# Patient Record
Sex: MALE | Race: OTHER | URBAN - METROPOLITAN AREA
[De-identification: names, ages, dates, MRNs, and addresses within clinical notes are randomized per-mention and may not be internally consistent; named-entity substitution may affect disease eponyms.]

---

## 2019-04-05 NOTE — PATIENT DISCUSSION
PRESCRIBE SURGERY DROPS PENDING STUDY APPROVAL. IN THE EVENT THE PT DOES NOT PROCEED WITH THE STUDY. PATIENT PREFERS [facility].

## 2019-04-05 NOTE — PATIENT DISCUSSION
PATIENT MAY QUALIFY FOR LESS DROPS RESEARCH STUDY AND AFTER DISCUSSION WOULD LIKE TO RECEIVE MORE INFORMATION BEFORE DECIDING.

## 2019-04-05 NOTE — PATIENT DISCUSSION
REFRACTIVE ERROR, OU - DISCUSSED OPTION OF CORRECTING AT THE TIME OF CATARACT SURGERY. PT UNDERSTANDS AND ELECTS TO CONSIDER THEIR OPTIONS.  PT NOT AN EDOF OR MF CANDIDATE DUE TO ARMD.

## 2019-04-18 NOTE — PATIENT DISCUSSION
Surgery  Counseling: I have discussed the option of glasses versus cataract surgery versus following . It was explained that when vision no longer meets the patient's visual needs and a new prescription for glasses is not likely to improve all of the patient's visual symptoms, the option of cataract surgery is a reasonable next step. It was explained that there is no guarantee that removing the cataract will improve their visual symptoms, however; it is believed that the cataract is contributing to the patient's visual impairment and surgery may significantly improve both the visual and functional status of the patient. The risks, benefits and alternatives of surgery were discussed with the patient. After this discussion, the patient desires to proceed with cataract surgery with implantation of an intraocular lens to improve vision for reading small print, reducing glare.

## 2019-04-25 NOTE — PATIENT DISCUSSION
***This patient had traditional  cataract surgery performed. A monofocal IOL was placed to achieve a target refraction of plano (which should provide them with satisfactory distance vision with aid of glasses of contacts). ***

## 2021-03-04 ENCOUNTER — IMPORTED ENCOUNTER (OUTPATIENT)
Dept: URBAN - METROPOLITAN AREA CLINIC 9 | Facility: CLINIC | Age: 66
End: 2021-03-04

## 2021-03-04 PROBLEM — H31.011: Noted: 2021-03-04

## 2021-03-04 PROBLEM — H31.013: Noted: 2021-03-04

## 2021-03-04 PROBLEM — H40.1122: Status: STABILIZING | Noted: 2021-03-04

## 2021-03-04 PROBLEM — H40.1123: Status: STABILIZING | Noted: 2021-03-04

## 2021-10-16 ASSESSMENT — TONOMETRY
OS_IOP_MMHG: 19
OD_IOP_MMHG: 19

## 2021-10-16 ASSESSMENT — VISUAL ACUITY: OS_CC: 20/100 - SN

## 2021-11-02 ENCOUNTER — ESTABLISHED PATIENT (OUTPATIENT)
Dept: URBAN - METROPOLITAN AREA CLINIC 15 | Facility: CLINIC | Age: 66
End: 2021-11-02

## 2021-11-02 DIAGNOSIS — H40.1113: ICD-10-CM

## 2021-11-02 DIAGNOSIS — H40.1123: ICD-10-CM

## 2021-11-02 PROCEDURE — 92014 COMPRE OPH EXAM EST PT 1/>: CPT

## 2021-11-02 PROCEDURE — 92133 CPTRZD OPH DX IMG PST SGM ON: CPT

## 2021-11-02 ASSESSMENT — TONOMETRY
OD_IOP_MMHG: 22
OS_IOP_MMHG: 17

## 2021-11-02 ASSESSMENT — VISUAL ACUITY: OS_CC: 20/200

## 2022-01-18 NOTE — PATIENT DISCUSSION
Begin Tobramycin/Dexamethasone drops BID x 2 weeks and Maxitrol ointment at bedtime x 2 weeks, sent in opia.

## 2022-06-24 RX ORDER — AMLODIPINE BESYLATE 10 MG/1
TABLET ORAL
COMMUNITY

## 2022-06-24 RX ORDER — LATANOPROST 50 UG/ML
SOLUTION/ DROPS OPHTHALMIC
COMMUNITY

## 2022-06-24 RX ORDER — BRINZOLAMIDE 10 MG/ML
SUSPENSION/ DROPS OPHTHALMIC
COMMUNITY

## 2022-06-24 RX ORDER — SIMVASTATIN 20 MG
TABLET ORAL
COMMUNITY

## 2022-06-24 RX ORDER — ESOMEPRAZOLE MAGNESIUM 40 MG/1
1 CAPSULE, DELAYED RELEASE ORAL
COMMUNITY

## 2022-06-24 RX ORDER — HYDROCHLOROTHIAZIDE 12.5 MG/1
TABLET ORAL
COMMUNITY

## 2022-06-24 RX ORDER — SILDENAFIL 100 MG/1
TABLET, FILM COATED ORAL
COMMUNITY

## 2022-06-24 RX ORDER — GLIMEPIRIDE 2 MG/1
TABLET ORAL
COMMUNITY

## 2022-06-30 ENCOUNTER — ESTABLISHED PATIENT (OUTPATIENT)
Dept: URBAN - METROPOLITAN AREA CLINIC 5 | Facility: CLINIC | Age: 67
End: 2022-06-30

## 2022-06-30 DIAGNOSIS — Z96.1: ICD-10-CM

## 2022-06-30 DIAGNOSIS — H40.1122: ICD-10-CM

## 2022-06-30 DIAGNOSIS — H40.1113: ICD-10-CM

## 2022-06-30 PROCEDURE — 99214 OFFICE O/P EST MOD 30 MIN: CPT

## 2022-06-30 ASSESSMENT — VISUAL ACUITY
OS_PH: 20/100-2
OS_CC: 20/200

## 2022-06-30 ASSESSMENT — TONOMETRY
OS_IOP_MMHG: 17
OD_IOP_MMHG: 22
OS_IOP_MMHG: 15
OD_IOP_MMHG: 20

## 2022-07-20 LAB
ANION GAP SERPL CALC-SCNC: 9 MMOL/L (ref 2–17)
BUN SERPL-MCNC: 26 MG/DL (ref 8–23)
CALCIUM SERPL-MCNC: 9 MG/DL (ref 8.8–10.2)
CHLORIDE SERPL-SCNC: 105 MMOL/L (ref 98–107)
CREAT SERPL-MCNC: 1.7 MG/DL (ref 0.7–1.3)
DEPRECATED HCO3 PLAS-SCNC: 26 MMOL/L (ref 22–29)
GFR SERPL CREATININE-BSD FRML MDRD: 44 ML/MIN/1.73M²
GLUCOSE SERPL-MCNC: 124 MG/DL (ref 70–99)
NT-PROBNP SERPL-MCNC: 524 PG/ML (ref 0–125)
OSMOLALITY SERPL CALC.SUM OF ELEC: 285 MOSM/KG (ref 270–287)
POTASSIUM SERPL-SCNC: 4 MMOL/L (ref 3.5–5.3)
SODIUM SERPL-SCNC: 140 MMOL/L (ref 135–145)

## 2022-07-21 LAB
A/G RATIO: 1.3 (ref 0.7–1.7)
ALBUMIN ELP: 3.3 G/DL (ref 2.9–4.4)
ALPHA 1: 0.1 G/DL (ref 0–0.4)
ALPHA 2: 0.5 G/DL (ref 0.4–1)
BETA GLOBULIN: 0.9 G/DL (ref 0.7–1.3)
COMMENT: ABNORMAL
GAMMA GLOBULIN: 1.2 G/DL (ref 0.4–1.8)
GLOBULIN: 2.6 G/DL (ref 2.2–3.9)
KAPPA FREE LIGHT CHAINS QNT: 54.9 MG/L (ref 3.3–19.4)
KAPPA/LAMBDA FREE LIGHT CHAIN RATIO: 2.16 (ref 0.26–1.65)
LAMBDA FREE LIGHT CHAINS QNT: 25.4 MG/L (ref 5.7–26.3)
M SPIKE: ABNORMAL G/DL
PROTEIN TOTAL: 5.9 G/DL (ref 6–8.5)

## 2022-07-26 LAB
Lab: 10.9 %
Lab: 11 %
Lab: 2.3 %
Lab: 4 %
Lab: 71.8 %
Lab: ABNORMAL
Lab: ABNORMAL
M SPIKE % 24HR URINE PROTEIN ELEC: ABNORMAL %
PROTEIN 24 HOUR URINE: 982 MG/24HR (ref 30–150)
URINE TOTAL PROTEIN: 51.7 MG/DL

## 2023-01-19 ENCOUNTER — ESTABLISHED PATIENT (OUTPATIENT)
Dept: URBAN - METROPOLITAN AREA CLINIC 12 | Facility: CLINIC | Age: 68
End: 2023-01-19

## 2023-01-19 DIAGNOSIS — Z96.1: ICD-10-CM

## 2023-01-19 DIAGNOSIS — H40.1113: ICD-10-CM

## 2023-01-19 DIAGNOSIS — H31.011: ICD-10-CM

## 2023-01-19 DIAGNOSIS — H35.342: ICD-10-CM

## 2023-01-19 DIAGNOSIS — H40.1122: ICD-10-CM

## 2023-01-19 PROCEDURE — 92014 COMPRE OPH EXAM EST PT 1/>: CPT

## 2023-01-19 PROCEDURE — 92134 CPTRZ OPH DX IMG PST SGM RTA: CPT

## 2023-01-19 ASSESSMENT — VISUAL ACUITY
OS_CC: 20/400
OU_CC: 20/400

## 2023-01-19 ASSESSMENT — TONOMETRY
OS_IOP_MMHG: 10
OD_IOP_MMHG: 14

## 2023-07-06 ENCOUNTER — ESTABLISHED PATIENT (OUTPATIENT)
Dept: URBAN - METROPOLITAN AREA CLINIC 12 | Facility: CLINIC | Age: 68
End: 2023-07-06

## 2023-07-06 DIAGNOSIS — H40.1113: ICD-10-CM

## 2023-07-06 DIAGNOSIS — H40.1122: ICD-10-CM

## 2023-07-06 DIAGNOSIS — Z96.1: ICD-10-CM

## 2023-07-06 PROCEDURE — 92012 INTRM OPH EXAM EST PATIENT: CPT

## 2023-07-06 ASSESSMENT — TONOMETRY
OD_IOP_MMHG: 17
OS_IOP_MMHG: 12

## 2023-07-06 ASSESSMENT — VISUAL ACUITY: OS_CC: 20/400

## 2024-01-18 ENCOUNTER — ESTABLISHED PATIENT (OUTPATIENT)
Dept: URBAN - METROPOLITAN AREA CLINIC 12 | Facility: CLINIC | Age: 69
End: 2024-01-18

## 2024-01-18 DIAGNOSIS — H04.123: ICD-10-CM

## 2024-01-18 DIAGNOSIS — Z96.1: ICD-10-CM

## 2024-01-18 DIAGNOSIS — H31.011: ICD-10-CM

## 2024-01-18 DIAGNOSIS — H35.342: ICD-10-CM

## 2024-01-18 DIAGNOSIS — H40.1122: ICD-10-CM

## 2024-01-18 DIAGNOSIS — H40.1113: ICD-10-CM

## 2024-01-18 PROCEDURE — 99214 OFFICE O/P EST MOD 30 MIN: CPT

## 2024-01-18 PROCEDURE — 92015 DETERMINE REFRACTIVE STATE: CPT

## 2024-01-18 PROCEDURE — 92133 CPTRZD OPH DX IMG PST SGM ON: CPT

## 2024-01-18 ASSESSMENT — TONOMETRY
OS_IOP_MMHG: 14
OD_IOP_MMHG: 16

## 2024-01-18 ASSESSMENT — VISUAL ACUITY: OS_CC: 20/400

## 2024-08-01 ENCOUNTER — FOLLOW UP (OUTPATIENT)
Dept: URBAN - METROPOLITAN AREA CLINIC 12 | Facility: CLINIC | Age: 69
End: 2024-08-01

## 2024-08-01 DIAGNOSIS — H31.011: ICD-10-CM

## 2024-08-01 DIAGNOSIS — H35.342: ICD-10-CM

## 2024-08-01 DIAGNOSIS — Z96.1: ICD-10-CM

## 2024-08-01 DIAGNOSIS — H40.1122: ICD-10-CM

## 2024-08-01 DIAGNOSIS — H40.1113: ICD-10-CM

## 2024-08-01 DIAGNOSIS — H04.123: ICD-10-CM

## 2024-08-01 PROCEDURE — 92134 CPTRZ OPH DX IMG PST SGM RTA: CPT

## 2024-08-01 PROCEDURE — 99213 OFFICE O/P EST LOW 20 MIN: CPT

## 2024-08-01 ASSESSMENT — TONOMETRY
OS_IOP_MMHG: 15
OD_IOP_MMHG: 18

## 2024-08-01 ASSESSMENT — VISUAL ACUITY: OS_CC: 20/100

## 2024-08-14 ENCOUNTER — COMPREHENSIVE EXAM (OUTPATIENT)
Dept: URBAN - METROPOLITAN AREA CLINIC 18 | Facility: CLINIC | Age: 69
End: 2024-08-14

## 2024-08-14 DIAGNOSIS — H35.81: ICD-10-CM

## 2024-08-14 DIAGNOSIS — H35.373: ICD-10-CM

## 2024-08-14 DIAGNOSIS — H43.811: ICD-10-CM

## 2024-08-14 DIAGNOSIS — H35.342: ICD-10-CM

## 2024-08-14 DIAGNOSIS — H31.013: ICD-10-CM

## 2024-08-14 PROCEDURE — 92134 CPTRZ OPH DX IMG PST SGM RTA: CPT

## 2024-08-14 PROCEDURE — 92014 COMPRE OPH EXAM EST PT 1/>: CPT

## 2024-08-14 PROCEDURE — 92201 OPSCPY EXTND RTA DRAW UNI/BI: CPT

## 2024-08-14 ASSESSMENT — VISUAL ACUITY: OS_CC: 20/200

## 2024-08-14 ASSESSMENT — TONOMETRY
OD_IOP_MMHG: 14
OS_IOP_MMHG: 11

## 2025-04-04 NOTE — PATIENT DISCUSSION
Dry Age Related Macular Degeneration (AMD) Counseling: Age Related Macular Degeneration is a deterioration or breakdown of the eye's macula, the part of the retina that is responsible for central vision. Symptoms include, but are not limited to, hazy vision, difficulty seeing when going from bright light to low light, and a blank or blurry spot in your central vision. I have explained to the patient that successful management is dependent on patient compliance with treatment as prescribed and/or regular follow-up to monitor for possible progression. Pt presents to ED via wheelchair from home with c/o abdominal cramping pain, nausea,  generalized weakness, and constipation x 1 week. Pt has hx of HLD and CABG. Pt family reports pt appears more pale than baseline. Pt noted to have tremor family reports appears worse than baseline.

## 2025-06-26 ENCOUNTER — FOLLOW UP (OUTPATIENT)
Age: 70
End: 2025-06-26

## 2025-06-26 DIAGNOSIS — H40.1122: ICD-10-CM

## 2025-06-26 DIAGNOSIS — H35.342: ICD-10-CM

## 2025-06-26 DIAGNOSIS — H43.811: ICD-10-CM

## 2025-06-26 DIAGNOSIS — H35.373: ICD-10-CM

## 2025-06-26 DIAGNOSIS — Z98.890: ICD-10-CM

## 2025-06-26 DIAGNOSIS — Z96.1: ICD-10-CM

## 2025-06-26 DIAGNOSIS — H35.81: ICD-10-CM

## 2025-06-26 DIAGNOSIS — H31.013: ICD-10-CM

## 2025-06-26 DIAGNOSIS — H40.1113: ICD-10-CM

## 2025-06-26 DIAGNOSIS — H04.123: ICD-10-CM

## 2025-06-26 PROCEDURE — 92015 DETERMINE REFRACTIVE STATE: CPT

## 2025-06-26 PROCEDURE — 99214 OFFICE O/P EST MOD 30 MIN: CPT

## 2025-06-26 PROCEDURE — 92133 CPTRZD OPH DX IMG PST SGM ON: CPT
